# Patient Record
Sex: FEMALE | Race: OTHER | Employment: UNEMPLOYED | URBAN - METROPOLITAN AREA
[De-identification: names, ages, dates, MRNs, and addresses within clinical notes are randomized per-mention and may not be internally consistent; named-entity substitution may affect disease eponyms.]

---

## 2024-10-15 ENCOUNTER — HOSPITAL ENCOUNTER (EMERGENCY)
Age: 39
Discharge: HOME | End: 2024-10-15

## 2024-10-15 VITALS
RESPIRATION RATE: 16 BRPM | DIASTOLIC BLOOD PRESSURE: 81 MMHG | HEART RATE: 75 BPM | OXYGEN SATURATION: 97 % | WEIGHT: 178.1 LBS | SYSTOLIC BLOOD PRESSURE: 120 MMHG | TEMPERATURE: 98 F

## 2024-10-15 DIAGNOSIS — S61.012A LACERATION OF LEFT THUMB WITHOUT FOREIGN BODY WITHOUT DAMAGE TO NAIL, INITIAL ENCOUNTER: Primary | ICD-10-CM

## 2024-10-15 PROCEDURE — 90715 TDAP VACCINE 7 YRS/> IM: CPT | Performed by: GENERAL ACUTE CARE HOSPITAL

## 2024-10-15 PROCEDURE — 12002 RPR S/N/AX/GEN/TRNK2.6-7.5CM: CPT

## 2024-10-15 PROCEDURE — 99284 EMERGENCY DEPT VISIT MOD MDM: CPT

## 2024-10-15 PROCEDURE — 2500000003 HC RX 250 WO HCPCS: Performed by: GENERAL ACUTE CARE HOSPITAL

## 2024-10-15 PROCEDURE — 6370000000 HC RX 637 (ALT 250 FOR IP): Performed by: GENERAL ACUTE CARE HOSPITAL

## 2024-10-15 PROCEDURE — 90471 IMMUNIZATION ADMIN: CPT | Performed by: GENERAL ACUTE CARE HOSPITAL

## 2024-10-15 PROCEDURE — 6360000002 HC RX W HCPCS: Performed by: GENERAL ACUTE CARE HOSPITAL

## 2024-10-15 RX ORDER — LIDOCAINE HYDROCHLORIDE 20 MG/ML
5 INJECTION, SOLUTION INFILTRATION; PERINEURAL ONCE
Status: COMPLETED | OUTPATIENT
Start: 2024-10-15 | End: 2024-10-15

## 2024-10-15 RX ORDER — BUPIVACAINE HYDROCHLORIDE 5 MG/ML
30 INJECTION, SOLUTION EPIDURAL; INTRACAUDAL ONCE
Status: COMPLETED | OUTPATIENT
Start: 2024-10-15 | End: 2024-10-15

## 2024-10-15 RX ORDER — LIDOCAINE HYDROCHLORIDE AND EPINEPHRINE BITARTRATE 20; .01 MG/ML; MG/ML
20 INJECTION, SOLUTION SUBCUTANEOUS ONCE
Status: DISCONTINUED | OUTPATIENT
Start: 2024-10-15 | End: 2024-10-15

## 2024-10-15 RX ORDER — BACITRACIN ZINC 500 [USP'U]/G
OINTMENT TOPICAL ONCE
Status: COMPLETED | OUTPATIENT
Start: 2024-10-15 | End: 2024-10-15

## 2024-10-15 RX ADMIN — BUPIVACAINE HYDROCHLORIDE 150 MG: 5 INJECTION, SOLUTION EPIDURAL; INTRACAUDAL; PERINEURAL at 11:22

## 2024-10-15 RX ADMIN — LIDOCAINE HYDROCHLORIDE 5 ML: 20 INJECTION, SOLUTION INFILTRATION; PERINEURAL at 11:22

## 2024-10-15 RX ADMIN — BACITRACIN ZINC: 500 OINTMENT TOPICAL at 11:20

## 2024-10-15 RX ADMIN — TETANUS TOXOID, REDUCED DIPHTHERIA TOXOID AND ACELLULAR PERTUSSIS VACCINE, ADSORBED 0.5 ML: 5; 2.5; 8; 8; 2.5 SUSPENSION INTRAMUSCULAR at 11:21

## 2024-10-15 ASSESSMENT — PAIN DESCRIPTION - LOCATION
LOCATION: FINGER (COMMENT WHICH ONE)
LOCATION: FINGER (COMMENT WHICH ONE)

## 2024-10-15 ASSESSMENT — PAIN - FUNCTIONAL ASSESSMENT: PAIN_FUNCTIONAL_ASSESSMENT: ACTIVITIES ARE NOT PREVENTED

## 2024-10-15 ASSESSMENT — PAIN DESCRIPTION - FREQUENCY: FREQUENCY: CONTINUOUS

## 2024-10-15 ASSESSMENT — ENCOUNTER SYMPTOMS
COUGH: 0
SORE THROAT: 0
CHEST TIGHTNESS: 0
BACK PAIN: 0
ABDOMINAL PAIN: 0
NAUSEA: 0
WHEEZING: 0
VOICE CHANGE: 0
VOMITING: 0
SHORTNESS OF BREATH: 0

## 2024-10-15 ASSESSMENT — PAIN DESCRIPTION - DESCRIPTORS
DESCRIPTORS: ACHING
DESCRIPTORS: ACHING;SHARP

## 2024-10-15 ASSESSMENT — LIFESTYLE VARIABLES
HOW OFTEN DO YOU HAVE A DRINK CONTAINING ALCOHOL: NEVER
HOW MANY STANDARD DRINKS CONTAINING ALCOHOL DO YOU HAVE ON A TYPICAL DAY: PATIENT DOES NOT DRINK

## 2024-10-15 ASSESSMENT — PAIN DESCRIPTION - ORIENTATION
ORIENTATION: LEFT
ORIENTATION: LEFT

## 2024-10-15 ASSESSMENT — PAIN SCALES - GENERAL
PAINLEVEL_OUTOF10: 3
PAINLEVEL_OUTOF10: 3

## 2024-10-15 ASSESSMENT — PAIN DESCRIPTION - PAIN TYPE: TYPE: ACUTE PAIN

## 2024-10-15 ASSESSMENT — PAIN DESCRIPTION - ONSET: ONSET: ON-GOING

## 2024-10-15 NOTE — ED NOTES
Pt to ED with c/o laceration to left thumb. Pt states she cut her thumb yesterday evening with a knife while cutting meat.

## 2024-10-15 NOTE — ED NOTES
D/C: Order noted for d/c. Pt confirmed d/c paperwork has correct name. Discharge and education instructions reviewed with patient. Teach-back successful.  Pt verbalized understanding and denied questions at this time. No acute distress noted. Patient instructed to follow-up as noted - return to emergency department if symptoms worsen. Patient verbalized understanding. Discharged per EDMD with discharge instructions. Pt discharged to private vehicle. Patient stable upon departure. Thanked patient for [facility] for care. Provider aware of patient pain at time of discharge.

## 2024-10-15 NOTE — DISCHARGE INSTRUCTIONS
Keep wound completely dry for the first 24 hours.  You may wash your hands gently with antibacterial soap and water but do not submerge the area in water until sutures are removed.  Wear finger splint for the next 1 to 2 days.  Take OTC Tylenol or ibuprofen as directed for discomfort.  Watch closely for signs or symptoms of infection and return if any should develop.    The 4 stitches should be removed by primary care provider or urgent care in 7 to 9 days.

## 2024-10-15 NOTE — ED PROVIDER NOTES
**ADVANCED PRACTICE PROVIDER, I HAVE EVALUATED THIS PATIENT**        [facility] EMERGENCY DEPARTMENT  EMERGENCY DEPARTMENT ENCOUNTER      Pt Name: Zehra Benson Phan  MRN:[id number]  Birthdate 1985  Date of evaluation: 10/15/2024  Provider: ALEJANDRA Rivera CNP  Note Started: 12:17 PM EDT 10/15/24     used throughout ED visit for communication.    Chief Complaint:    Chief Complaint   Patient presents with    Finger Laceration     Pt to ED with c/o laceration to left thumb. Pt states she cut her thumb yesterday evening with a knife while cutting meat.         Nursing Notes, Past Medical Hx, Past Surgical Hx, Social Hx, Allergies, and Family Hx were all reviewed and agreed with or any disagreements were addressed in the HPI.    HPI: (Location, Duration, Timing, Severity, Quality, Assoc Sx, Context, Modifying factors)    History From: Patient  Limitations to history : Language patient spoke Slovenian.   was used.    Social Determinants Significantly Affecting Health : None    Chief Complaint of laceration of left thumb    This is a  38 y.o. female who presents laceration of left thumb. Patient was cutting meat last night when she slipped and cut her thumb close to the nail. Patient is unsure when she received her last tetanus.  Patient is right-hand dominant.  She reports mild pain that she rates a 3 out of 10.  She reports a burning sensation.  Pain is worse with touching the affected area.  Patient does have full movement and sensation of this digit.  She has otherwise felt well and has been without fever, chills, or other symptoms.  She has not taken anything for the symptoms.      PastMedical/Surgical History:  History reviewed. No pertinent past medical history.  History reviewed. No pertinent surgical history.    Medications:  There are no discharge medications for this patient.      Review of Systems:  (1 systems needed)  Review of Systems

## 2024-10-24 ENCOUNTER — HOSPITAL ENCOUNTER (EMERGENCY)
Age: 39
Discharge: HOME | End: 2024-10-24

## 2024-10-24 VITALS
SYSTOLIC BLOOD PRESSURE: 124 MMHG | TEMPERATURE: 98.2 F | WEIGHT: 195.77 LBS | HEART RATE: 74 BPM | OXYGEN SATURATION: 98 % | RESPIRATION RATE: 19 BRPM | DIASTOLIC BLOOD PRESSURE: 59 MMHG

## 2024-10-24 DIAGNOSIS — Z48.02 ENCOUNTER FOR REMOVAL OF SUTURES: Primary | ICD-10-CM

## 2024-10-24 PROCEDURE — 99282 EMERGENCY DEPT VISIT SF MDM: CPT

## 2024-10-24 ASSESSMENT — ENCOUNTER SYMPTOMS
VOMITING: 0
NAUSEA: 0
WHEEZING: 0
CHEST TIGHTNESS: 0
ABDOMINAL PAIN: 0
COUGH: 0
SHORTNESS OF BREATH: 0

## 2024-10-24 ASSESSMENT — PAIN - FUNCTIONAL ASSESSMENT: PAIN_FUNCTIONAL_ASSESSMENT: NONE - DENIES PAIN

## 2024-10-24 NOTE — ED NOTES
Pt confirmed d/c paperwork has correct name. Discharge and education instructions reviewed with patient. Teach-back successful.  Pt verbalized understanding and denied questions at this time. No acute distress noted. Patient instructed to follow-up as noted - return to emergency department if symptoms worsen. Patient verbalized understanding. Discharged per EDMD with discharge instructions. Pt discharged to private vehicle. Patient stable upon departure. Thanked patient for choosing [facility] for care. Provider aware of patient pain at time of discharge.

## 2024-10-24 NOTE — DISCHARGE INSTRUCTIONS
Continue to wash twice a day with antibacterial soap and water for the next 2 to 3 days.  You can take 500 mg of Tylenol every 6 hours, 600 mg of Motrin every 6 hours.  If you develop redness, increased swelling, drainage from the site, particularly if you develop fevers, chills, nausea, vomiting please return for a wound check or follow-up with your primary care doctor.    Continúe lavándose dos veces al día con agua y jabón antibacteriano jaden los próximos 2 a 3 días.  Puedes lainey 500 mg de Tylenol cada 6 horas, 600 mg de Motrin cada 6 horas.  Si presenta enrojecimiento, aumento de la hinchazón o drenaje del sitio, especialmente si presenta fiebre, escalofríos, náuseas o vómitos, regrese para que le revisen la herida o viktor un seguimiento con nettles médico de atención primaria.

## 2024-10-24 NOTE — ED TRIAGE NOTES
Pt arrives to ED via private vehicle from home. Pt states that she received stitches on left thumb on 10/15/24. Pt states that she needs her sutures removed. Pt AAO x 4. VSS.

## 2024-10-24 NOTE — ED PROVIDER NOTES
[facility] EMERGENCY DEPARTMENT  EMERGENCY DEPARTMENT ENCOUNTER        Pt Name: Zehra Chisholm  MRN: [id number]  Birthdate 1985  Date of evaluation: 10/24/2024  Provider: ALEJANDRA Montalvo CNP  PCP: No primary care provider on file.  Note Started: 10:25 AM EDT 10/24/24      BERNARD. I have evaluated this patient.        CHIEF COMPLAINT       Chief Complaint   Patient presents with    Suture / Staple Removal     Pt arrives to ED via private vehicle from home. Pt states that she received stitches on left thumb on 10/15/24. Pt states that she needs her sutures removed. Pt AAO x 4. VSS.        HISTORY OF PRESENT ILLNESS: 1 or more Elements     History From: Patient    Limitations to history : Language -  utilized throughout the visit.     Social Determinants Significantly Affecting Health : None    Chief Complaint:Suture removal    Zehra Chisholm is a 38 y.o. female who presents to the ED for suture removal.10 days ago patient presented to the emergency room with a laceration to the left thumb.  There is no concern for foreign body, tendon, or muscle involvement. No indication for imaging. Following her laceration the patient received a tetanus shot during her ED visit. Laceration was repaired with 4 sutures without complications. Patient returns today for suture removal reports mild swelling and pain to the area but has not experienced any issues with the sutures.    HPI obtained with the use of a , ID number [id number]    Nursing Notes were all reviewed and agreed with or any disagreements were addressed in the HPI.    REVIEW OF SYSTEMS :      Review of Systems   Constitutional:  Negative for chills, fatigue and fever.   Respiratory:  Negative for cough, chest tightness, shortness of breath and wheezing.    Cardiovascular:  Negative for chest pain and palpitations.   Gastrointestinal:  Negative for abdominal pain, nausea and  with supportive therapy, continued home wound care, return precaution    The patient tolerated their visit well.  The patient and / or the family were informed of the results of any tests, a time was given to answer questions, a plan was proposed and they agreed with plan.    I am the Primary Clinician of Record.  FINAL IMPRESSION      1. Encounter for removal of sutures          DISPOSITION/PLAN     DISPOSITION Discharge - Pending Orders Complete 10/24/2024 10:22:37 AM           PATIENT REFERRED TO:  No follow-up provider specified.    DISCHARGE MEDICATIONS:  New Prescriptions    No medications on file       DISCONTINUED MEDICATIONS:  Discontinued Medications    No medications on file              (Please note that portions of this note were completed with a voice recognition program.  Efforts were made to edit the dictations but occasionally words are mis-transcribed.)    ALEJANDRA Montalvo CNP (electronically signed)       Shaunna Knowles APRN - CNP  10/24/24 1055